# Patient Record
Sex: MALE | Race: WHITE | NOT HISPANIC OR LATINO | Employment: OTHER | URBAN - METROPOLITAN AREA
[De-identification: names, ages, dates, MRNs, and addresses within clinical notes are randomized per-mention and may not be internally consistent; named-entity substitution may affect disease eponyms.]

---

## 2022-03-08 ENCOUNTER — OFFICE VISIT (OUTPATIENT)
Dept: NEUROSURGERY | Facility: CLINIC | Age: 47
End: 2022-03-08

## 2022-03-08 VITALS
SYSTOLIC BLOOD PRESSURE: 132 MMHG | HEART RATE: 74 BPM | WEIGHT: 223 LBS | DIASTOLIC BLOOD PRESSURE: 99 MMHG | HEIGHT: 71 IN | BODY MASS INDEX: 31.22 KG/M2

## 2022-03-08 DIAGNOSIS — M50.223 HERNIATED NUCLEUS PULPOSUS, C6-7 RIGHT: Primary | ICD-10-CM

## 2022-03-08 DIAGNOSIS — M54.2 CERVICALGIA: ICD-10-CM

## 2022-03-08 DIAGNOSIS — M54.12 RIGHT CERVICAL RADICULOPATHY: ICD-10-CM

## 2022-03-08 PROCEDURE — 99204 OFFICE O/P NEW MOD 45 MIN: CPT | Performed by: PHYSICIAN ASSISTANT

## 2022-03-08 RX ORDER — GABAPENTIN 100 MG/1
CAPSULE ORAL EVERY 12 HOURS SCHEDULED
COMMUNITY
Start: 2021-11-16

## 2022-03-08 RX ORDER — HYDROCODONE BITARTRATE AND ACETAMINOPHEN 7.5; 325 MG/1; MG/1
TABLET ORAL
COMMUNITY
Start: 2022-03-03

## 2022-03-08 NOTE — PROGRESS NOTES
"Chief Complaint  Neck Pain and Arm Pain (Right arm )    Subjective          Erick Puente who is a 46 y.o. year old male who presents to [facility] for Evaluation of the Spine.     The patient complains of pain located in the Cervical Spine.  Patients states the pain has been present for 4 weeks.  The pain came on gradually.  The pain scaled level is 7.  The pain does radiate. Dermatomes are located on right Cervical at: C6.  The pain is constant and waxing/waning and described as sharp, aching and burning.  The pain is worse at nighttime and awakens the patient at night. Patient states nothing improves the pain.  Patient states laying down, riding in a truck makes the pain worse.    Associated Symptoms Include: Numbness and tingling in the right arm to the 1-3 digits. He does report subjective weakness in the right arm.  Conservative Interventions Include: Pain Medications that were ineffective., Gabapentin that was somewhat effective. and Toradol and Noflex shots in the ER which were not very effective.    Was this the result of an injury or accident?: No    History of Previous Spinal Surgery?: No     reports that he has never smoked. His smokeless tobacco use includes chew.    Review of Systems   Musculoskeletal: Positive for myalgias, neck pain and neck stiffness.   Neurological: Positive for weakness and numbness.        Objective   Vital Signs:   /99   Pulse 74   Ht 179.1 cm (70.5\")   Wt 101 kg (223 lb)   BMI 31.54 kg/m²       Physical Exam  Constitutional:       Appearance: Normal appearance. He is obese.   Neck:      Comments: Pain with ROM  Pulmonary:      Effort: Pulmonary effort is normal.   Musculoskeletal:         General: Tenderness (midline cervical spine) present.      Comments: Ravindra's negative bilaterally,  Clonus negative bilaterally   Neurological:      General: No focal deficit present.      Mental Status: He is alert and oriented to " person, place, and time.      Sensory: No sensory deficit.      Motor: Weakness (elbow flexion on the right) present.      Gait: Gait normal.      Deep Tendon Reflexes: Reflexes normal.   Psychiatric:         Mood and Affect: Mood normal.         Behavior: Behavior normal.        Neurologic Exam     Mental Status   Oriented to person, place, and time.        Result Review     I personally reviewed the radiology report for MRI of cervical spine without contrast from 1/6/2022 which shows multilevel degenerative disc disease with mild spinal stenosis, this is worse at C6-C7 there is a right paracentric disc bulge and extrusion contacting the right ventral cord and contributing to severe right foraminal narrowing.       Assessment and Plan    Diagnoses and all orders for this visit:    1. Herniated nucleus pulposus, C6-7 right (Primary)  -     Ambulatory Referral to Physical Therapy Evaluate and treat; (Cervical Traction); Stretching (Cervical Traction), ROM, Strengthening    2. Cervicalgia  -     Ambulatory Referral to Physical Therapy Evaluate and treat; (Cervical Traction); Stretching (Cervical Traction), ROM, Strengthening    3. Right cervical radiculopathy  -     Ambulatory Referral to Physical Therapy Evaluate and treat; (Cervical Traction); Stretching (Cervical Traction), ROM, Strengthening    His pain does seem to be in a C6 dermatome on the right and somewhat into C7 with the middle finger. He does have foraminal narrowing worse on the right at C6-C7. I do believe this relates to his pain. He may benefit from an ACDF at this level. My main concern with surgery would be adjacent level disease, as he does have degenerative change at C5-C6 already.    He could consider PT, particular cervical traction, to assess benefit for his symptoms.    He could consider CESB to assess benefit for his symptoms.    He did discuss surgery with Dr. Holliday today, who recommend a trial of PT with traction. I will place an order  today for PT in [place].    Dr. Holliday also recommended he quit nicotine.    We discussed the importance of smoking/nicotine cessation. Smoking/nicotine use has multiple health risks. In particular related to the spine, nicotine increases the incidence of lower back pain, speeds up the progression of degenerative disc disease and dramatically reduces healing after spine surgery (particularly a fusion operation).     The patient was counseled on basic recommendations for the reduction and prevention of back, neck, or spine pain in association with spinal disorders, including: cessation/avoidance of nicotine use, maintenance of a healthy BMI and weight, focusing on building/maintaining core strength through core exercise, and avoidance of activities which worsen the pain. Surgery for patients with multilevel degenerative disc disease is not typically successful, with the risks outweighing the benefit. The patient will monitor for changes in symptoms and notify our clinic of these changes as needed.    He will follow-up in 6 weeks with Dr. Holliday.    Follow Up   Return in about 6 weeks (around 4/19/2022).  Patient was given instructions and counseling regarding his condition or for health maintenance advice. Please see specific information pulled into the AVS if appropriate.

## 2022-03-24 ENCOUNTER — TELEPHONE (OUTPATIENT)
Dept: NEUROSURGERY | Facility: CLINIC | Age: 47
End: 2022-03-24

## 2022-03-24 NOTE — TELEPHONE ENCOUNTER
Caller: Erick Puente    Relationship: Self    Best call back number: [phone]    What orders are you requesting (i.e. lab or imaging): Referral to Physical Therapy for Herniated nucleus pulposus, C6-7 right; Cervicalgia; Right cervical radiculopathy (03/08/2022)    Where will you receive your lab/imaging services:     [facility]  [address] [place] [address], [place] [address]    PH: [phone]  FX: [phone]    Additional notes: PATIENT CALLED TO INDICATE HE HAD NOT YET HEARD FROM [facility] IN [place] FOR SCHEDULING HIS PHYSICAL THERAPY APPOINTMENT AND IT'S BEEN TWO WEEKS.     I CONTACTED [facility] WHO INFORMED ME THEY DO NOT HAVE AN ORDER OR REFERRAL FOR THE PATIENT. S/W ELVER WHO PROVIDED ANOTHER FAX #: [phone].    PLEASE RE-SEND REFERRAL WITH PHYS THERAPY ORDER AND PATIENT WISHES TO BE CONTACTED ONCE IT HAS BEEN RE-SENT.    THANK YOU VERY MUCH.

## 2022-04-19 ENCOUNTER — OFFICE VISIT (OUTPATIENT)
Dept: NEUROSURGERY | Facility: CLINIC | Age: 47
End: 2022-04-19

## 2022-04-19 VITALS
WEIGHT: 235 LBS | BODY MASS INDEX: 32.9 KG/M2 | HEIGHT: 71 IN | SYSTOLIC BLOOD PRESSURE: 135 MMHG | DIASTOLIC BLOOD PRESSURE: 78 MMHG

## 2022-04-19 DIAGNOSIS — M47.22 CERVICAL SPONDYLOSIS WITH RADICULOPATHY: Primary | ICD-10-CM

## 2022-04-19 PROCEDURE — 99213 OFFICE O/P EST LOW 20 MIN: CPT | Performed by: NEUROLOGICAL SURGERY

## 2022-04-19 NOTE — PROGRESS NOTES
Erikc Puente is a 46 y.o. male that presents with Neck Pain       He is doing well at the present time. He has had several flareups of pain into the right arm.       Review of Systems   Musculoskeletal: Positive for myalgias and neck pain.        Physical Exam  Neurological:      Mental Status: He is alert.        I personally reviewed the patient's MRI scan which shows multilevel foraminal narrowing (C3-4 through C6-7).       Assessment and Plan {CC Problem List  Visit Diagnosis  ROS  Review (Popup)  [facility] Maintenance  Quality  BestPractice  Medications  SmartSets  SnapShot Encounters  Media :23}   Problem List Items Addressed This Visit    None     Visit Diagnoses     Cervical spondylosis with radiculopathy    -  Primary        He would ideally avoid surgery as long as possible secondary to the fact he has multilevel degenerative changes and foraminal narrowing.He might benefit from home traction.    We discussed the importance of smoking/nicotine cessation. Smoking/nicotine use has multiple health risks. In particular related to the spine, nicotine increases the incidence of lower back pain, speeds up the progression of degenerative disc disease and dramatically reduces healing after spine surgery (particularly a fusion operation).     Follow Up {Instructions Charge Capture  Follow-up Communications :23}   No follow-ups on file.